# Patient Record
Sex: FEMALE | Race: WHITE
[De-identification: names, ages, dates, MRNs, and addresses within clinical notes are randomized per-mention and may not be internally consistent; named-entity substitution may affect disease eponyms.]

---

## 2019-01-27 ENCOUNTER — HOSPITAL ENCOUNTER (EMERGENCY)
Dept: HOSPITAL 10 - FTE | Age: 64
Discharge: HOME | End: 2019-01-27
Payer: MEDICARE

## 2019-01-27 ENCOUNTER — HOSPITAL ENCOUNTER (EMERGENCY)
Dept: HOSPITAL 91 - FTE | Age: 64
Discharge: HOME | End: 2019-01-27
Payer: MEDICARE

## 2019-01-27 VITALS
WEIGHT: 163.58 LBS | HEIGHT: 62 IN | BODY MASS INDEX: 30.1 KG/M2 | BODY MASS INDEX: 30.1 KG/M2 | WEIGHT: 163.58 LBS | HEIGHT: 62 IN

## 2019-01-27 VITALS — SYSTOLIC BLOOD PRESSURE: 132 MMHG | HEART RATE: 73 BPM | RESPIRATION RATE: 20 BRPM | DIASTOLIC BLOOD PRESSURE: 66 MMHG

## 2019-01-27 DIAGNOSIS — I10: ICD-10-CM

## 2019-01-27 DIAGNOSIS — Z79.82: ICD-10-CM

## 2019-01-27 DIAGNOSIS — R51: Primary | ICD-10-CM

## 2019-01-27 DIAGNOSIS — Z86.73: ICD-10-CM

## 2019-01-27 PROCEDURE — 99285 EMERGENCY DEPT VISIT HI MDM: CPT

## 2019-01-27 PROCEDURE — 70450 CT HEAD/BRAIN W/O DYE: CPT

## 2019-01-27 PROCEDURE — 96372 THER/PROPH/DIAG INJ SC/IM: CPT

## 2019-01-27 RX ADMIN — KETOROLAC TROMETHAMINE 1 MG: 30 INJECTION, SOLUTION INTRAMUSCULAR at 16:35

## 2019-01-27 NOTE — ERD
ER Documentation


Chief Complaint


Chief Complaint





Complains of severe headache x 2 days





HPI


63-year-old female presents for severe headache times 2 days.  She does have a 


history of headaches however she states that the headache is different this 


time.  She states that the pain is sharp, rated 8 out of 10.  She took Tylenol 


Motrin at home without relief.  She does have a past medical history of CVA, 


rheumatoid arthritis, hypothyroidism.  Denies nausea or vomiting.  Denies chest 


pain or shortness of breath.





ROS


All systems reviewed and are negative except as per history of present illness.





Medications


Home Meds


Active Scripts


Acetamin/Butalbital/Caffeine* (Fioricet*) [id number] Tab, 1 TAB PO Q6H PRN 


for PAIN LEVEL 1-5, #30 TAB


   Prov:BHANU SERVIN DO         1/27/19


Tramadol HCl (Tramadol HCl) 50 Mg Tablet, 50 MG PO Q4 PRN for PAIN, #20 TAB


   Prov:SHALA COLON         12/16/15


Reported Medications


Infliximab (Remicade) 100 Mg Soln, 100 MG IV Q3-4 WEEKS


   12/15/15


Trazodone Hcl* (Trazodone Hcl*) 50 Mg Tablet, 50 MG PO QHS PRN for INSOMNIA, #30


TAB


   12/15/15


Levothyroxine Sodium* (Levothyroxine Sodium*) 50 Mcg Tablet, 50 MCG PO BEFORE 


BREAKFAST, #30 TAB


   12/15/15


Ibuprofen* (Ibuprofen*) 600 Mg Tablet, 600 MG PO Q6H PRN for PAIN, TAB


   12/15/15


Aspirin* (Aspirin* (EC)) 81 Mg Tablet.dr, 81 MG PO DAILY, TAB


   12/15/15





Allergies


Allergies:  


Coded Allergies:  


     codeine (Verified  Allergy, Unknown, 12/15/15)





PMhx/Soc


History of Surgery:  No


Anesthesia Reaction:  No


Hx Neurological Disorder:  Yes (acute stroke 3 yrs ago; hx of stroke 10 yrs ago)


Hx Respiratory Disorders:  No


Hx Cardiac Disorders:  Yes (HTN, )


Hx Psychiatric Problems:  No


Hx Miscellaneous Medical Probl:  Yes (thyriod, arthritis)


Hx Alcohol Use:  Yes (socially)


Hx Substance Use:  No


Hx Tobacco Use:  No


Smoking Status:  Never smoker





Physical Exam


Vitals


Vital Signs


  Date      Temp  Pulse  Resp  B/P (MAP)   Pulse Ox  O2          O2 Flow    FiO2


Time                                                 Delivery    Rate


   1/27/19  99.2     73    20      132/66        98


     14:12                           (88)





Physical Exam


Const:   No acute distress


Head:   Atraumatic, no temporal area tenderness to palpation 


Eyes:    Normal Conjunctiva, pupils equal, round, reactive to light bilaterally


ENT:    Normal External Ears, bilateral tympanic membrane intact without 


erythema or bulging noted, Nose and Mouth.  No tonsillar swelling or exudate 


noted


Neck:               Full range of motion. No meningismus, no bruits noted


Resp:   Clear to auscultation bilaterally


Cardio:   Regular rate and rhythm, no murmurs, bilateral radial and dorsalis 


pedis pulses intact


Skin:   No petechiae or rashes


Ext:    No cyanosis, or edema, 5 out of 5 muscular bilateral upper and lower 


extremities


Neur:   Awake and alert, bilateral upper and lower extremity sensation intact


Psych:    Normal Mood and Affect


Results 24 hrs





Current Medications


 Medications
   Dose
          Sig/Jeremiah
       Start Time
   Status  Last


 (Trade)       Ordered        Route
 PRN     Stop Time              Admin
Dose


                              Reason                                Admin


 Ketorolac
     30 mg          ONCE  STAT
    1/27/19       DC           1/27/19


Tromethamine
                 IM
            16:23
                       16:35



 (Toradol)                                   1/27/19 16:24








Procedures/MDM


Medical Decision Making:





Differential diagnosis includes but not limited to primary headache, 


subarachnoid hemorrhage, meningitis, temporal arteritis, glaucoma, hypertension,


cerebral ischemia, carotid or vertebral arterial dissection, brain tumor.





Patient appeared well on physical examination, nontoxic appearing.


No history of fever.  There is low suspicion for meningitis.


Given patient's age and no temporal area tenderness to palpation, low suspicion 


for temporal arteritis.


Patient has no vision changes and pupils are reactive bilaterally, low suspicion


for glaucoma.


There is also no focal neurologic deficits to suggest a brain tumor.


Patient has normal sensation and muscle strength, low suspicion for cerebral 


ischemia.





Given headache is different from the ones patient has had in the past, head CT 


was done.


Head CT was negative.





In the ER patient given Toradol


Symptoms improved with treatment.





Patient given prescription for Fioricet





Patient likely has an exacerbation of her primary headache.





Patient advised to follow up with PCP in 1-2 days. Patient advised to return to 


ED for new or worsening symptoms. Patient stable on discharge from the ED.











Disclaimer: Inadvertent spelling and grammatical errors are likely due to 


EHR/dictation software use and do not reflect on the overall quality of patient 


care. Also, please note that the electronic time recorded on this note does not 


necessarily reflect the actual time of the patient encounter.





Departure


Diagnosis:  


   Primary Impression:  


   Headache


Condition:  Fair


Patient Instructions:  Self-Care for Headaches


Referrals:  


CYRIL FARAH (PCP)





Additional Instructions:  


Llame al doctor MAANA y rhianna luis alberto CHEMA PARA DENTRO DE 1-2 GONZALES.Dgale a la 


secretaria que nosotros le instruimos hacer esta chema.Avise o llame si goel 


condicin se empeora antes de la chema. Regresa aqui si peor o no mejor.











BHANU SERVIN DO                 Jan 27, 2019 20:11

## 2019-09-23 ENCOUNTER — HOSPITAL ENCOUNTER (EMERGENCY)
Dept: HOSPITAL 91 - FTE | Age: 64
Discharge: HOME | End: 2019-09-23
Payer: MEDICARE

## 2019-09-23 ENCOUNTER — HOSPITAL ENCOUNTER (EMERGENCY)
Dept: HOSPITAL 10 - FTE | Age: 64
Discharge: HOME | End: 2019-09-23
Payer: MEDICARE

## 2019-09-23 VITALS — HEART RATE: 66 BPM | RESPIRATION RATE: 16 BRPM | DIASTOLIC BLOOD PRESSURE: 76 MMHG | SYSTOLIC BLOOD PRESSURE: 128 MMHG

## 2019-09-23 VITALS
BODY MASS INDEX: 27.33 KG/M2 | HEIGHT: 64 IN | WEIGHT: 160.06 LBS | BODY MASS INDEX: 27.33 KG/M2 | WEIGHT: 160.06 LBS | HEIGHT: 64 IN

## 2019-09-23 DIAGNOSIS — Z79.82: ICD-10-CM

## 2019-09-23 DIAGNOSIS — R39.89: Primary | ICD-10-CM

## 2019-09-23 DIAGNOSIS — I10: ICD-10-CM

## 2019-09-23 DIAGNOSIS — Z86.73: ICD-10-CM

## 2019-09-23 PROCEDURE — 99284 EMERGENCY DEPT VISIT MOD MDM: CPT

## 2019-09-23 PROCEDURE — 81003 URINALYSIS AUTO W/O SCOPE: CPT

## 2019-09-23 PROCEDURE — 76856 US EXAM PELVIC COMPLETE: CPT
